# Patient Record
(demographics unavailable — no encounter records)

---

## 2017-03-22 NOTE — EMERGENCY ROOM REPORT
History of Present Illness


General


Chief Complaint:  Abdominal Pain


Source:  Patient, Family Member





Present Illness


HPI


This is a 5-year-old boy who presents with chief complaint of cough, fever, 

abdominal pain.  Onset for 2 days.  He also has vomiting secondary to coughing.

  No diarrhea.  Does have runny nose and now with right ear pain.  No other 

complaint.


Allergies:  


Coded Allergies:  


     No Known Allergies (Unverified , 3/22/17)





Patient History


Past Medical History:  none


Past Surgical History:  none


Pertinent Family History:  no significant inherited disorders


Social History:  none


Immunizations:  UTD


Reviewed Nursing Documentation:  PMH: Agreed, PSxH: Agreed





Nursing Documentation-PM


Past Medical History:  No Stated History





Review of Systems


Constitutional:  Reports: fevers


Eye:  Denies: redness


ENT:  Reports: congestion, earache, sore throat


Respiratory:  Reports: cough


Cardiovascular:  Denies: chest pain


Gastrointestinal:  Reports: nausea, pain, Denies: diarrhea, vomiting


Skin:  Denies: rash


All Other Systems:  negative except mentioned in HPI





Physical Exam


Physical Exam





Vital Signs








  Date Time  Temp Pulse Resp B/P Pulse Ox O2 Delivery O2 Flow Rate FiO2


 


3/22/17 03:47 97.9 101 23 106/68 100 Room Air  





vitals normal


Sp02 EP Interpretation:  reviewed, normal


General Appearance:  no apparent distress, alert, non-toxic, active/playful/

smiles, normal attentiveness for age


Head:  normocephalic, atraumatic


Eyes:  bilateral eye EOMI, bilateral eye PERRL


ENT:  nasal exam normal, oropharynx normal, other - Right TM show erythema.


Neck:  neck supple, symmetric, no masses, full ROM without pain


Respiratory:  effort normal, no rhonchi, no wheezing, no retractions


Cardiovascular:  RRR, no murmur, gallop, rub


Gastrointestinal:  non tender, no mass, non-distended, other - Hyperactive 

bowel sounds


Musculoskeletal:  normal ROM, strength & tone normal


Neurologic:  motor strength/tone normal


Skin:  no petechiae, no rash


Lymphatic:  normal cervical nodes





Medical Decision Making


Diagnostic Impression:  


 Primary Impression:  


 Viral illness


 Additional Impressions:  


 Otitis media


 Qualified Codes:  H66.91 - Otitis media, unspecified, right ear


 Abdominal pain


 Qualified Codes:  R10.84 - Generalized abdominal pain


ER Course


Patient with a viral illness and vague abdominal pain.  He has a right otitis 

media.  Abdominal exam is benign.  He looks well.  No guarding or rebound.  

Doubt appendicitis or acute abdomen.  No torsion.  We'll discharge him with 

reassurance.





Last Vital Signs








  Date Time  Temp Pulse Resp B/P Pulse Ox O2 Delivery O2 Flow Rate FiO2


 


3/22/17 03:47 97.9 101 23 106/68 100 Room Air  








Status:  unchanged


Disposition:  HOME, SELF-CARE


Condition:  Stable


Scripts


Amoxicillin* (AMOXICILLIN*) 250 Mg/5 Ml Susp.recon


500 MG ORAL BID, #150 ML


   Prov: RYAN WRIGHT M.D.         3/22/17


Patient Instructions:  Abdominal Pain, Pediatric





Additional Instructions:  


Followup with your DrJudie in one to 2 days.  Return if worse.











RYAN WRIGHT M.D. Mar 22, 2017 04:15